# Patient Record
Sex: MALE | Race: WHITE | NOT HISPANIC OR LATINO | Employment: OTHER | URBAN - NONMETROPOLITAN AREA
[De-identification: names, ages, dates, MRNs, and addresses within clinical notes are randomized per-mention and may not be internally consistent; named-entity substitution may affect disease eponyms.]

---

## 2023-04-14 DIAGNOSIS — Z12.11 ENCOUNTER FOR SCREENING COLONOSCOPY: Primary | ICD-10-CM

## 2023-04-26 ENCOUNTER — ANESTHESIA (OUTPATIENT)
Dept: GASTROENTEROLOGY | Facility: HOSPITAL | Age: 53
End: 2023-04-26
Payer: MEDICARE

## 2023-04-26 ENCOUNTER — HOSPITAL ENCOUNTER (OUTPATIENT)
Facility: HOSPITAL | Age: 53
Setting detail: HOSPITAL OUTPATIENT SURGERY
Discharge: HOME | End: 2023-04-26
Attending: SURGERY | Admitting: SURGERY
Payer: MEDICARE

## 2023-04-26 ENCOUNTER — ANESTHESIA EVENT (OUTPATIENT)
Dept: GASTROENTEROLOGY | Facility: HOSPITAL | Age: 53
End: 2023-04-26
Payer: MEDICARE

## 2023-04-26 VITALS
OXYGEN SATURATION: 94 % | HEART RATE: 82 BPM | WEIGHT: 132.5 LBS | RESPIRATION RATE: 17 BRPM | DIASTOLIC BLOOD PRESSURE: 66 MMHG | TEMPERATURE: 97.3 F | SYSTOLIC BLOOD PRESSURE: 113 MMHG

## 2023-04-26 DIAGNOSIS — Z12.11 ENCOUNTER FOR SCREENING COLONOSCOPY: ICD-10-CM

## 2023-04-26 PROCEDURE — S0260 H&P FOR SURGERY: HCPCS | Performed by: SURGERY

## 2023-04-26 PROCEDURE — 25010000002 PROPOFOL 10 MG/ML EMULSION: Performed by: NURSE ANESTHETIST, CERTIFIED REGISTERED

## 2023-04-26 RX ORDER — RISPERIDONE 2 MG/1
2 TABLET ORAL EVERY EVENING
COMMUNITY

## 2023-04-26 RX ORDER — METOPROLOL SUCCINATE 25 MG/1
25 TABLET, EXTENDED RELEASE ORAL DAILY
COMMUNITY

## 2023-04-26 RX ORDER — DEXTROSE AND SODIUM CHLORIDE 5; .45 G/100ML; G/100ML
50 INJECTION, SOLUTION INTRAVENOUS CONTINUOUS
Status: DISCONTINUED | OUTPATIENT
Start: 2023-04-26 | End: 2023-04-26 | Stop reason: HOSPADM

## 2023-04-26 RX ORDER — PROPOFOL 10 MG/ML
VIAL (ML) INTRAVENOUS AS NEEDED
Status: DISCONTINUED | OUTPATIENT
Start: 2023-04-26 | End: 2023-04-26 | Stop reason: SURG

## 2023-04-26 RX ORDER — LIDOCAINE HYDROCHLORIDE 20 MG/ML
INJECTION, SOLUTION INTRAVENOUS AS NEEDED
Status: DISCONTINUED | OUTPATIENT
Start: 2023-04-26 | End: 2023-04-26 | Stop reason: SURG

## 2023-04-26 RX ORDER — BUSPIRONE HYDROCHLORIDE 7.5 MG/1
7.5 TABLET ORAL DAILY
COMMUNITY

## 2023-04-26 RX ADMIN — PROPOFOL 100 MG: 10 INJECTION, EMULSION INTRAVENOUS at 07:59

## 2023-04-26 RX ADMIN — LIDOCAINE HYDROCHLORIDE 50 MG: 20 INJECTION, SOLUTION INTRAVENOUS at 07:59

## 2023-04-26 RX ADMIN — PROPOFOL 480 MG: 10 INJECTION, EMULSION INTRAVENOUS at 08:00

## 2023-04-26 RX ADMIN — DEXTROSE AND SODIUM CHLORIDE 50 ML/HR: 5; 450 INJECTION, SOLUTION INTRAVENOUS at 07:51

## 2023-04-26 NOTE — ANESTHESIA POSTPROCEDURE EVALUATION
Patient: Cassi Elam    Procedure Summary     Date: 04/26/23 Room / Location: [facility] ENDOSCOPY 2 / [facility] ENDOSCOPY    Anesthesia Start: 0756 Anesthesia Stop: 0823    Procedure: COLONOSCOPY Diagnosis:       Encounter for screening colonoscopy      (Encounter for screening colonoscopy [Z12.11])    Surgeons: Sudhir Khoury MD Provider: Edward Mcbride CRNA    Anesthesia Type: general, MAC ASA Status: 2          Anesthesia Type: general, MAC    Vitals  No vitals data found for the desired time range.          Post Anesthesia Care and Evaluation    Patient location during evaluation: PHASE II  Patient participation: waiting for patient participation  Level of consciousness: sleepy but conscious  Pain management: adequate    Airway patency: patent  Anesthetic complications: No anesthetic complications  PONV Status: none  Cardiovascular status: acceptable  Respiratory status: acceptable, spontaneous ventilation and room air  Hydration status: acceptable

## 2023-04-26 NOTE — ANESTHESIA PREPROCEDURE EVALUATION
Anesthesia Evaluation     Patient summary reviewed and Nursing notes reviewed   NPO Solid Status: > 8 hours  NPO Liquid Status: > 4 hours           Airway   Mallampati: II  TM distance: >3 FB  Neck ROM: full  Possible difficult intubation  Dental    (+) edentulous    Pulmonary     breath sounds clear to auscultation  (+) a smoker Current Smoked day of surgery, COPD mild, decreased breath sounds,   Cardiovascular - negative cardio ROS and normal exam  Exercise tolerance: good (4-7 METS)    Rhythm: regular  Rate: normal        Neuro/Psych- negative ROS  GI/Hepatic/Renal/Endo - negative ROS     Musculoskeletal     (+) arthralgias,   Abdominal  - normal exam   Substance History - negative use     OB/GYN negative ob/gyn ROS         Other   arthritis,                    Anesthesia Plan    ASA 2     general and MAC   total IV anesthesia  intravenous induction     Anesthetic plan, risks, benefits, and alternatives have been provided, discussed and informed consent has been obtained with: patient.  Pre-procedure education provided  Plan discussed with CRNA and medical student.        CODE STATUS:

## 2023-04-26 NOTE — H&P
[facility]   PREOPERATIVE HISTORY AND PHYSICAL    Patient Name:Cassi Elam  : 1970  MRN: [id number]  Primary Care Physician: Ramin Mckeon MD  Date of admission: 2023    Subjective   Subjective     Chief Complaint: preoperative evaluation    History of Present Illness  Patient has never had a colonoscopy before.  He denies any changes in bowel function such as new constipation, diarrhea, or blood per rectum.  He presents for screening colonoscopy.    Cassi Elam is a 52 y.o. male who presents for preoperative evaluation. He is scheduled for COLONOSCOPY (N/A)    Review of Systems   Constitutional: Negative.    HENT: Negative.    Respiratory: Negative.    Cardiovascular: Negative.    Gastrointestinal: Negative.    Genitourinary: Negative.    Musculoskeletal: Negative.    Skin: Negative.         Personal History     PMH  History of head injury  Anxiety  Lumbosacral degenerative disc disease  B12 deficiency  Explosive personality disorder  Hypertension    Surgical History: None    Family History: No history of colon cancer    Social History: He  has no history on file for tobacco use, alcohol use, and drug use.    Home Medications:  Toprol 25 mg daily  BuSpar 7.5 mg daily  Oxybutynin 10 mg daily  Risperdal 2 mg daily    Allergies:  He has No Known Allergies.    Objective    Objective     Vitals:    Temp:  [98.3 °F (36.8 °C)] 98.3 °F (36.8 °C)  Heart Rate:  [81] 81  Resp:  [20] 20  BP: (145)/(85) 145/85    Physical Exam  Constitutional:       Appearance: Normal appearance.   HENT:      Head: Normocephalic and atraumatic.   Cardiovascular:      Rate and Rhythm: Normal rate.   Pulmonary:      Effort: Pulmonary effort is normal. No respiratory distress.   Abdominal:      General: There is no distension.      Palpations: Abdomen is soft.      Tenderness: There is no abdominal tenderness.   Skin:     General: Skin is warm.      Coloration: Skin is not jaundiced.   Neurological:       General: No focal deficit present.      Mental Status: He is alert. Mental status is at baseline.         Assessment & Plan   Assessment / Plan     Brief Patient Summary:  Cassi Elam is a 52 y.o. male who presents for preoperative evaluation.    Pre-Op Diagnosis Codes:     * Encounter for screening colonoscopy [Z12.11]    Active Hospital Problems:  Active Hospital Problems    Diagnosis    • **Encounter for screening colonoscopy      Plan:   Procedure(s):  COLONOSCOPY    The risks, benefits, and alternatives of the procedure including but not limited to bleeding and perforation and risks of the anesthesia were discussed in detail with the patient and questions were answered. No guarantees were made or implied. Informed consent was obtained.    Sudhir Khoury MD

## 2023-04-27 LAB — REF LAB TEST METHOD: NORMAL

## 2023-05-10 ENCOUNTER — OFFICE VISIT (OUTPATIENT)
Dept: SURGERY | Facility: CLINIC | Age: 53
End: 2023-05-10
Payer: MEDICARE

## 2023-05-10 VITALS
OXYGEN SATURATION: 100 % | WEIGHT: 127 LBS | BODY MASS INDEX: 20.41 KG/M2 | TEMPERATURE: 97.4 F | DIASTOLIC BLOOD PRESSURE: 70 MMHG | HEIGHT: 66 IN | SYSTOLIC BLOOD PRESSURE: 110 MMHG | HEART RATE: 74 BPM

## 2023-05-10 DIAGNOSIS — D12.6 TUBULAR ADENOMA OF COLON: Primary | ICD-10-CM

## 2023-05-10 NOTE — PROGRESS NOTES
Chief Complaint   Patient presents with   • Follow-up     4/26/23 endo results        52-year-old male 2 weeks status post colonoscopy.  He has no complaints.  He is eating normally and having normal bowel function.      No past medical history on file.    Past Surgical History:   Procedure Laterality Date   • COLONOSCOPY N/A 4/26/2023    Procedure: COLONOSCOPY;  Surgeon: Sudhir Khoury MD;  Location: [facility] ENDOSCOPY;  Service: General;  Laterality: N/A;         Current Outpatient Medications:   •  busPIRone (BUSPAR) 7.5 MG tablet, Take 1 tablet by mouth Daily., Disp: , Rfl:   •  metoprolol succinate XL (TOPROL-XL) 25 MG 24 hr tablet, Take 1 tablet by mouth Daily., Disp: , Rfl:   •  risperiDONE (risperDAL) 2 MG tablet, Take 1 tablet by mouth Every Evening., Disp: , Rfl:     No Known Allergies    Immunization History   Administered Date(s) Administered   • COVID-19 (GABBY) 04/08/2021        No family history on file.    Social History     Socioeconomic History   • Marital status: Single   Tobacco Use   • Smoking status: Some Days     Packs/day: 0.25     Years: 35.00     Pack years: 8.75     Types: Cigarettes     Passive exposure: Past   • Smokeless tobacco: Never   • Tobacco comments:     Passive as a child until he started smoking   Substance and Sexual Activity   • Alcohol use: Not Currently     Comment: none in 30 years   • Drug use: Never   • Sexual activity: Defer           Vitals:    05/10/23 1048   BP: 110/70   Pulse: 74   Temp: 97.4 °F (36.3 °C)   SpO2: 100%       Physical Exam  Constitutional:       Appearance: Normal appearance.   HENT:      Head: Normocephalic and atraumatic.   Cardiovascular:      Rate and Rhythm: Normal rate.   Pulmonary:      Effort: Pulmonary effort is normal. No respiratory distress.   Neurological:      Mental Status: He is alert.   Psychiatric:         Mood and Affect: Mood normal.         Behavior: Behavior normal.         [facility]   [address]     [place]  [address]   Phone: [phone] or [phone]   Fax: [phone]   Zac Bob M.D., Medical Director     PATIENT NAME                                     LABORATORY NO.   BRENDAN TELLEZ.                            [id number]   [id number]                                 AGE                    SEX   N              CLIENT REF #   [facility]                   52        1970           [id number]      [id number]     [facility]                               REQUESTING M.D.           ATTENDING MParkerD.         COPY TO.   [address] [place] [address]                         [place] [address]                     DATE COLLECTED            DATE RECEIVED          DATE REPORTED   2023     DIAGNOSIS:   ASCENDING COLON POLYP:   Tubular adenoma     JBS/patricia     CLINICAL HISTORY:   Encounter for screening colonoscopy     SPECIMENS RECEIVED:   ASCENDING COLON POLYP     MICROSCOPIC DESCRIPTION:   Tissue blocks are prepared and slides are examined microscopically on all   specimens. See diagnosis for details.     Professional interpretation rendered by Chito Feliciano M.D. at [facility], [address] [place] [address].     GROSS DESCRIPTION:   Labeled ascending colon polyp are 3 portions of tan soft tissue measuring 0.6 x   0.4 x 0.2 cm in aggregate.  Submitted entirely in 1 block.  BW 3     REVIEWED, DIAGNOSED AND ELECTRONICALLY   SIGNED BY:     Chito Feliciano M.D.     ASSESSMENT    Diagnoses and all orders for this visit:    1. Tubular adenoma of colon (Primary)        PLAN    1.  Patient has recovered well from his colonoscopy.  He can follow-up in 5 years with repeat colonoscopy.              This document has been electronically signed by Violet Khoury MD on May 10, 2023 10:55 CDT

## (undated) DEVICE — GLV SURG SENSICARE PI ORTHO SZ6.5 LF STRL

## (undated) DEVICE — SINGLE-USE BIOPSY FORCEPS: Brand: RADIAL JAW 4

## (undated) DEVICE — CANN SMPL SOFTECH BIFLO ETCO2 A/M 7FT